# Patient Record
Sex: FEMALE | Race: WHITE | Employment: FULL TIME | URBAN - METROPOLITAN AREA
[De-identification: names, ages, dates, MRNs, and addresses within clinical notes are randomized per-mention and may not be internally consistent; named-entity substitution may affect disease eponyms.]

---

## 2017-01-16 ENCOUNTER — OFFICE VISIT (OUTPATIENT)
Dept: SURGERY | Age: 42
End: 2017-01-16

## 2017-01-16 DIAGNOSIS — K35.30 ACUTE APPENDICITIS WITH LOCALIZED PERITONITIS: Primary | ICD-10-CM

## 2017-01-16 PROCEDURE — 99024 POSTOP FOLLOW-UP VISIT: CPT | Performed by: SURGERY

## 2017-03-15 ENCOUNTER — OFFICE VISIT (OUTPATIENT)
Dept: FAMILY MEDICINE CLINIC | Age: 42
End: 2017-03-15

## 2017-03-15 VITALS
SYSTOLIC BLOOD PRESSURE: 114 MMHG | HEART RATE: 79 BPM | OXYGEN SATURATION: 97 % | DIASTOLIC BLOOD PRESSURE: 60 MMHG | WEIGHT: 193 LBS | BODY MASS INDEX: 30.29 KG/M2 | RESPIRATION RATE: 18 BRPM | HEIGHT: 67 IN

## 2017-03-15 DIAGNOSIS — A08.4 VIRAL GASTROENTERITIS: Primary | ICD-10-CM

## 2017-03-15 PROCEDURE — 99213 OFFICE O/P EST LOW 20 MIN: CPT | Performed by: NURSE PRACTITIONER

## 2017-08-25 ENCOUNTER — OFFICE VISIT (OUTPATIENT)
Dept: FAMILY MEDICINE CLINIC | Age: 42
End: 2017-08-25

## 2017-08-25 VITALS
DIASTOLIC BLOOD PRESSURE: 76 MMHG | SYSTOLIC BLOOD PRESSURE: 112 MMHG | BODY MASS INDEX: 31.11 KG/M2 | WEIGHT: 198.2 LBS | HEIGHT: 67 IN | HEART RATE: 91 BPM | OXYGEN SATURATION: 98 % | RESPIRATION RATE: 16 BRPM

## 2017-08-25 DIAGNOSIS — L30.9 ECZEMA, UNSPECIFIED TYPE: Primary | ICD-10-CM

## 2017-08-25 PROCEDURE — 99213 OFFICE O/P EST LOW 20 MIN: CPT | Performed by: REGISTERED NURSE

## 2017-08-25 RX ORDER — TRIAMCINOLONE ACETONIDE 0.25 MG/G
OINTMENT TOPICAL
Qty: 1 TUBE | Refills: 1 | Status: SHIPPED | OUTPATIENT
Start: 2017-08-25 | End: 2017-09-01

## 2017-08-25 ASSESSMENT — PATIENT HEALTH QUESTIONNAIRE - PHQ9
SUM OF ALL RESPONSES TO PHQ QUESTIONS 1-9: 1
1. LITTLE INTEREST OR PLEASURE IN DOING THINGS: 0
2. FEELING DOWN, DEPRESSED OR HOPELESS: 1
SUM OF ALL RESPONSES TO PHQ9 QUESTIONS 1 & 2: 1

## 2017-08-25 ASSESSMENT — ENCOUNTER SYMPTOMS
SHORTNESS OF BREATH: 0
COUGH: 0
CHEST TIGHTNESS: 0

## 2019-07-18 ENCOUNTER — OFFICE VISIT (OUTPATIENT)
Dept: FAMILY MEDICINE CLINIC | Age: 44
End: 2019-07-18
Payer: COMMERCIAL

## 2019-07-18 VITALS
DIASTOLIC BLOOD PRESSURE: 80 MMHG | BODY MASS INDEX: 30.45 KG/M2 | SYSTOLIC BLOOD PRESSURE: 134 MMHG | HEIGHT: 67 IN | WEIGHT: 194 LBS

## 2019-07-18 DIAGNOSIS — M25.471 ANKLE EDEMA, BILATERAL: Primary | ICD-10-CM

## 2019-07-18 DIAGNOSIS — M25.472 ANKLE EDEMA, BILATERAL: Primary | ICD-10-CM

## 2019-07-18 LAB
A/G RATIO: 1.5 (ref 1.1–2.2)
ALBUMIN SERPL-MCNC: 4.2 G/DL (ref 3.4–5)
ALP BLD-CCNC: 56 U/L (ref 40–129)
ALT SERPL-CCNC: 17 U/L (ref 10–40)
ANION GAP SERPL CALCULATED.3IONS-SCNC: 11 MMOL/L (ref 3–16)
AST SERPL-CCNC: 14 U/L (ref 15–37)
BASOPHILS ABSOLUTE: 0.1 K/UL (ref 0–0.2)
BASOPHILS RELATIVE PERCENT: 0.7 %
BILIRUB SERPL-MCNC: 0.3 MG/DL (ref 0–1)
BUN BLDV-MCNC: 11 MG/DL (ref 7–20)
CALCIUM SERPL-MCNC: 9.4 MG/DL (ref 8.3–10.6)
CHLORIDE BLD-SCNC: 101 MMOL/L (ref 99–110)
CO2: 24 MMOL/L (ref 21–32)
CREAT SERPL-MCNC: 0.5 MG/DL (ref 0.6–1.1)
D DIMER: <200 NG/ML DDU (ref 0–229)
EOSINOPHILS ABSOLUTE: 0.3 K/UL (ref 0–0.6)
EOSINOPHILS RELATIVE PERCENT: 5 %
GFR AFRICAN AMERICAN: >60
GFR NON-AFRICAN AMERICAN: >60
GLOBULIN: 2.8 G/DL
GLUCOSE BLD-MCNC: 95 MG/DL (ref 70–99)
HCT VFR BLD CALC: 38.6 % (ref 36–48)
HEMOGLOBIN: 12.9 G/DL (ref 12–16)
LYMPHOCYTES ABSOLUTE: 1.4 K/UL (ref 1–5.1)
LYMPHOCYTES RELATIVE PERCENT: 20.1 %
MCH RBC QN AUTO: 30.2 PG (ref 26–34)
MCHC RBC AUTO-ENTMCNC: 33.5 G/DL (ref 31–36)
MCV RBC AUTO: 90 FL (ref 80–100)
MONOCYTES ABSOLUTE: 0.4 K/UL (ref 0–1.3)
MONOCYTES RELATIVE PERCENT: 5.2 %
NEUTROPHILS ABSOLUTE: 4.8 K/UL (ref 1.7–7.7)
NEUTROPHILS RELATIVE PERCENT: 69 %
PDW BLD-RTO: 12.8 % (ref 12.4–15.4)
PLATELET # BLD: 254 K/UL (ref 135–450)
PMV BLD AUTO: 9 FL (ref 5–10.5)
POTASSIUM SERPL-SCNC: 4.5 MMOL/L (ref 3.5–5.1)
PRO-BNP: 150 PG/ML (ref 0–124)
RBC # BLD: 4.29 M/UL (ref 4–5.2)
SODIUM BLD-SCNC: 136 MMOL/L (ref 136–145)
TOTAL PROTEIN: 7 G/DL (ref 6.4–8.2)
WBC # BLD: 6.9 K/UL (ref 4–11)

## 2019-07-18 PROCEDURE — 36415 COLL VENOUS BLD VENIPUNCTURE: CPT | Performed by: REGISTERED NURSE

## 2019-07-18 PROCEDURE — 99213 OFFICE O/P EST LOW 20 MIN: CPT | Performed by: REGISTERED NURSE

## 2019-07-18 ASSESSMENT — ENCOUNTER SYMPTOMS
COUGH: 0
COLOR CHANGE: 0
GASTROINTESTINAL NEGATIVE: 1
CHEST TIGHTNESS: 0
SHORTNESS OF BREATH: 0
WHEEZING: 0

## 2019-07-18 NOTE — PROGRESS NOTES
results have been reported in  early pregnancy due to insufficient amounts of hCG and in late  first trimester pregnancies, though very rare, due to the hook  effect. Always repeat results in question with a serum  quantitative pregnancy test. A serum hCG is positive 2-5 days  before the urine pregnancy test.      Color, UA 01/02/2017 YELLOW  Straw/Yellow Final    Clarity, UA 01/02/2017 Clear  Clear Final    Glucose, Ur 01/02/2017 Negative  Negative mg/dL Final    Bilirubin Urine 01/02/2017 Negative  Negative Final    Ketones, Urine 01/02/2017 Negative  Negative mg/dL Final    Specific Gravity, UA 01/02/2017 1.010  1.005 - 1.030 Final    Blood, Urine 01/02/2017 Negative  Negative Final    pH, UA 01/02/2017 7.5  5.0 - 8.0 Final    Protein, UA 01/02/2017 Negative  Negative mg/dL Final    Urobilinogen, Urine 01/02/2017 0.2  <2.0 E.U./dL Final    Nitrite, Urine 01/02/2017 Negative  Negative Final    Leukocyte Esterase, Urine 01/02/2017 Negative  Negative Final    Microscopic Examination 01/02/2017 Not Indicated   Final    Urine Reflex to Culture 01/02/2017 Not Indicated   Final    Urine Type 01/02/2017 Not Specified   Final    Sodium 01/03/2017 138  136 - 145 mmol/L Final    Potassium 01/03/2017 3.9  3.5 - 5.1 mmol/L Final    Chloride 01/03/2017 102  99 - 110 mmol/L Final    CO2 01/03/2017 24  21 - 32 mmol/L Final    Anion Gap 01/03/2017 12  3 - 16 Final    Glucose 01/03/2017 97  70 - 99 mg/dL Final    BUN 01/03/2017 5* 7 - 20 mg/dL Final    CREATININE 01/03/2017 <0.5* 0.6 - 1.1 mg/dL Final    GFR Non- 01/03/2017 >60  >60 Final    Comment: >60 mL/min/1.73m2 EGFR, calc. for ages 25 and older using the  MDRD formula (not corrected for weight), is valid for stable  renal function.  GFR  01/03/2017 >60  >60 Final    Comment: Chronic Kidney Disease: less than 60 ml/min/1.73 sq.m. Kidney Failure: less than 15 ml/min/1.73 sq.m.   Results valid for patients CBC Auto Differential     Standing Status:   Future     Number of Occurrences:   1     Standing Expiration Date:   7/17/2020    Comprehensive Metabolic Panel     Standing Status:   Future     Number of Occurrences:   1     Standing Expiration Date:   7/17/2020    Brain Natriuretic Peptide     Standing Status:   Future     Number of Occurrences:   1     Standing Expiration Date:   7/17/2020    D-Dimer, Quantitative     Standing Status:   Future     Number of Occurrences:   1     Standing Expiration Date:   7/17/2020       Return if symptoms worsen or fail to improve.     Joseph Griggs NP    7/18/2019  8:04 AM

## 2019-09-27 ENCOUNTER — HOSPITAL ENCOUNTER (OUTPATIENT)
Age: 44
Discharge: HOME | End: 2019-09-27

## 2019-09-27 ENCOUNTER — OFFICE VISIT (OUTPATIENT)
Dept: FAMILY MEDICINE CLINIC | Age: 44
End: 2019-09-27
Payer: COMMERCIAL

## 2019-09-27 ENCOUNTER — HOSPITAL ENCOUNTER (OUTPATIENT)
Dept: GENERAL RADIOLOGY | Age: 44
Discharge: HOME | End: 2019-09-27

## 2019-09-27 VITALS
HEIGHT: 67 IN | BODY MASS INDEX: 30.38 KG/M2 | SYSTOLIC BLOOD PRESSURE: 126 MMHG | RESPIRATION RATE: 16 BRPM | HEART RATE: 70 BPM | DIASTOLIC BLOOD PRESSURE: 82 MMHG

## 2019-09-27 DIAGNOSIS — S93.402A SPRAIN OF LEFT ANKLE, UNSPECIFIED LIGAMENT, INITIAL ENCOUNTER: ICD-10-CM

## 2019-09-27 DIAGNOSIS — S93.402A SPRAIN OF LEFT ANKLE, UNSPECIFIED LIGAMENT, INITIAL ENCOUNTER: Primary | ICD-10-CM

## 2019-09-27 DIAGNOSIS — Z23 NEED FOR INFLUENZA VACCINATION: ICD-10-CM

## 2019-09-27 PROCEDURE — 90471 IMMUNIZATION ADMIN: CPT | Performed by: NURSE PRACTITIONER

## 2019-09-27 PROCEDURE — 90686 IIV4 VACC NO PRSV 0.5 ML IM: CPT | Performed by: NURSE PRACTITIONER

## 2019-09-27 PROCEDURE — 73610 X-RAY EXAM OF ANKLE: CPT

## 2019-09-27 PROCEDURE — 99213 OFFICE O/P EST LOW 20 MIN: CPT | Performed by: NURSE PRACTITIONER

## 2019-09-27 RX ORDER — METHYLPREDNISOLONE 4 MG/1
TABLET ORAL
Refills: 0 | COMMUNITY
Start: 2019-09-23 | End: 2019-11-07 | Stop reason: ALTCHOICE

## 2019-09-27 ASSESSMENT — PATIENT HEALTH QUESTIONNAIRE - PHQ9
2. FEELING DOWN, DEPRESSED OR HOPELESS: 0
1. LITTLE INTEREST OR PLEASURE IN DOING THINGS: 0
SUM OF ALL RESPONSES TO PHQ QUESTIONS 1-9: 0
SUM OF ALL RESPONSES TO PHQ9 QUESTIONS 1 & 2: 0
SUM OF ALL RESPONSES TO PHQ QUESTIONS 1-9: 0

## 2019-09-27 NOTE — PROGRESS NOTES
resistance.)  2. While sitting on the floor or in a chair, loop the other end of the band over the top of your affected foot. 3. Keeping your knee and leg straight, slowly flex your foot to pull back on the exercise band, and then slowly relax. 4. Repeat 8 to 12 times. Single-leg balance    1. Stand on a flat surface with your arms stretched out to your sides like you are making the letter \"T. \" Then lift your good leg off the floor, bending it at the knee. If you are not steady on your feet, use one hand to hold on to a chair, counter, or wall. 2. Standing on the leg with your affected ankle, keep that knee straight. Try to balance on that leg for up to 30 seconds. Then rest for up to 10 seconds. 3. Repeat 6 to 8 times. 4. When you can balance on your affected leg for 30 seconds with your eyes open, try to balance on it with your eyes closed. 5. When you can do this exercise with your eyes closed for 30 seconds and with ease and no pain, try standing on a pillow or piece of foam, and repeat steps 1 through 4. Follow-up care is a key part of your treatment and safety. Be sure to make and go to all appointments, and call your doctor if you are having problems. It's also a good idea to know your test results and keep a list of the medicines you take. Where can you learn more? Go to https://chdanieleb.Red[employer]. org and sign in to your [employer] account. Enter Jada Candelaria in the [facility] box to learn more about \"Ankle Sprain: Rehab Exercises. \"     If you do not have an account, please click on the \"Sign Up Now\" link. Current as of: September 20, 2018  Content Version: 12.1  © 0082-9139 Healthwise, Incorporated. Care instructions adapted under license by [facility] ([facility]). If you have questions about a medical condition or this instruction, always ask your healthcare professional. [address] any warranty or liability for your use of this information.        When finished

## 2019-09-27 NOTE — PATIENT INSTRUCTIONS
loop the band around the inside of your affected foot. Then press your other foot against the band. 3. Keeping your legs crossed, slowly push your affected foot against the band so that foot moves away from your other foot. Then slowly relax. 4. Repeat 8 to 12 times. Resisted ankle eversion    1. Sit on the floor with your legs straight. 2. Hold both ends of an exercise band and loop the band around the outside of your affected foot. Then press your other foot against the band. 3. Keeping your leg straight, slowly push your affected foot outward against the band and away from your other foot without letting your leg rotate. Then slowly relax. 4. Repeat 8 to 12 times. Resisted ankle dorsiflexion    1. Tie the ends of an exercise band together to form a loop. Attach one end of the loop to a secure object or shut a door on it to hold it in place. (Or you can have someone hold one end of the loop to provide resistance.)  2. While sitting on the floor or in a chair, loop the other end of the band over the top of your affected foot. 3. Keeping your knee and leg straight, slowly flex your foot to pull back on the exercise band, and then slowly relax. 4. Repeat 8 to 12 times. Single-leg balance    1. Stand on a flat surface with your arms stretched out to your sides like you are making the letter \"T. \" Then lift your good leg off the floor, bending it at the knee. If you are not steady on your feet, use one hand to hold on to a chair, counter, or wall. 2. Standing on the leg with your affected ankle, keep that knee straight. Try to balance on that leg for up to 30 seconds. Then rest for up to 10 seconds. 3. Repeat 6 to 8 times. 4. When you can balance on your affected leg for 30 seconds with your eyes open, try to balance on it with your eyes closed.   5. When you can do this exercise with your eyes closed for 30 seconds and with ease and no pain, try standing on a pillow or piece of foam, and repeat steps 1 through 4. Follow-up care is a key part of your treatment and safety. Be sure to make and go to all appointments, and call your doctor if you are having problems. It's also a good idea to know your test results and keep a list of the medicines you take. Where can you learn more? Go to https://chpepiceweb.Sc[employer]. org and sign in to your [employer] account. Enter Tonio Martínez in the [employer] box to learn more about \"Ankle Sprain: Rehab Exercises. \"     If you do not have an account, please click on the \"Sign Up Now\" link. Current as of: September 20, 2018  Content Version: 12.1  © 5706-2596 Healthwise, Incorporated. Care instructions adapted under license by [facility] ([facility]). If you have questions about a medical condition or this instruction, always ask your healthcare professional. [address] any warranty or liability for your use of this information. When finished with the prednisone, can start ibuprofen 600-800 mg every 8 hours, or aleve 220-440 mg every 12 hours with food.   Rest Ice ACE wrap and elevate  When improves then start on the exercises

## 2019-11-07 ENCOUNTER — OFFICE VISIT (OUTPATIENT)
Dept: FAMILY MEDICINE CLINIC | Age: 44
End: 2019-11-07
Payer: COMMERCIAL

## 2019-11-07 VITALS
HEART RATE: 88 BPM | WEIGHT: 197.4 LBS | HEIGHT: 67 IN | OXYGEN SATURATION: 99 % | DIASTOLIC BLOOD PRESSURE: 72 MMHG | TEMPERATURE: 98.3 F | SYSTOLIC BLOOD PRESSURE: 118 MMHG | RESPIRATION RATE: 16 BRPM | BODY MASS INDEX: 30.98 KG/M2

## 2019-11-07 DIAGNOSIS — B96.89 ACUTE BACTERIAL SINUSITIS: Primary | ICD-10-CM

## 2019-11-07 DIAGNOSIS — J01.90 ACUTE BACTERIAL SINUSITIS: Primary | ICD-10-CM

## 2019-11-07 PROCEDURE — 99213 OFFICE O/P EST LOW 20 MIN: CPT | Performed by: NURSE PRACTITIONER

## 2019-11-07 RX ORDER — AMOXICILLIN AND CLAVULANATE POTASSIUM 875; 125 MG/1; MG/1
1 TABLET, FILM COATED ORAL 2 TIMES DAILY WITH MEALS
Qty: 20 TABLET | Refills: 0 | Status: SHIPPED | OUTPATIENT
Start: 2019-11-07 | End: 2019-11-17

## 2019-11-19 ENCOUNTER — OFFICE VISIT (OUTPATIENT)
Dept: FAMILY MEDICINE CLINIC | Age: 44
End: 2019-11-19
Payer: COMMERCIAL

## 2019-11-19 VITALS
SYSTOLIC BLOOD PRESSURE: 124 MMHG | OXYGEN SATURATION: 99 % | HEIGHT: 67 IN | TEMPERATURE: 98 F | BODY MASS INDEX: 30.7 KG/M2 | RESPIRATION RATE: 16 BRPM | HEART RATE: 96 BPM | WEIGHT: 195.6 LBS | DIASTOLIC BLOOD PRESSURE: 78 MMHG

## 2019-11-19 DIAGNOSIS — M54.2 NECK PAIN: ICD-10-CM

## 2019-11-19 DIAGNOSIS — G43.001 MIGRAINE WITHOUT AURA AND WITH STATUS MIGRAINOSUS, NOT INTRACTABLE: Primary | ICD-10-CM

## 2019-11-19 PROCEDURE — 99213 OFFICE O/P EST LOW 20 MIN: CPT | Performed by: REGISTERED NURSE

## 2019-11-19 RX ORDER — CYCLOBENZAPRINE HCL 10 MG
10 TABLET ORAL NIGHTLY PRN
Qty: 30 TABLET | Refills: 3 | Status: SHIPPED | OUTPATIENT
Start: 2019-11-19

## 2019-11-19 RX ORDER — TOPIRAMATE 25 MG/1
25 TABLET ORAL NIGHTLY
Qty: 30 TABLET | Refills: 3 | Status: SHIPPED | OUTPATIENT
Start: 2019-11-19

## 2019-11-19 ASSESSMENT — ENCOUNTER SYMPTOMS
ABDOMINAL PAIN: 0
NAUSEA: 1
PHOTOPHOBIA: 0
CONSTIPATION: 0
WHEEZING: 0
ABDOMINAL DISTENTION: 0
VOMITING: 0
COUGH: 0
CHEST TIGHTNESS: 0
SHORTNESS OF BREATH: 0
DIARRHEA: 0

## 2019-12-12 ENCOUNTER — NURSE ONLY (OUTPATIENT)
Dept: FAMILY MEDICINE CLINIC | Age: 44
End: 2019-12-12
Payer: COMMERCIAL

## 2019-12-12 DIAGNOSIS — R39.9 UTI SYMPTOMS: Primary | ICD-10-CM

## 2019-12-12 LAB
BILIRUBIN, POC: ABNORMAL
BLOOD URINE, POC: ABNORMAL
CLARITY, POC: CLEAR
COLOR, POC: YELLOW
GLUCOSE URINE, POC: ABNORMAL
KETONES, POC: ABNORMAL
LEUKOCYTE EST, POC: ABNORMAL
NITRITE, POC: ABNORMAL
PH, POC: 7
PROTEIN, POC: ABNORMAL
SPECIFIC GRAVITY, POC: 1.02
UROBILINOGEN, POC: 0.2

## 2019-12-12 PROCEDURE — 81002 URINALYSIS NONAUTO W/O SCOPE: CPT | Performed by: NURSE PRACTITIONER

## 2019-12-12 RX ORDER — SULFAMETHOXAZOLE AND TRIMETHOPRIM 800; 160 MG/1; MG/1
1 TABLET ORAL 2 TIMES DAILY
Qty: 14 TABLET | Refills: 0 | Status: SHIPPED | OUTPATIENT
Start: 2019-12-12 | End: 2019-12-26

## 2019-12-14 LAB — URINE CULTURE, ROUTINE: NORMAL

## 2020-03-15 ENCOUNTER — APPOINTMENT (OUTPATIENT)
Dept: CT IMAGING | Age: 45
End: 2020-03-15
Payer: COMMERCIAL

## 2020-03-15 ENCOUNTER — HOSPITAL ENCOUNTER (OUTPATIENT)
Age: 45
Setting detail: OBSERVATION
Discharge: HOME | End: 2020-03-16
Attending: EMERGENCY MEDICINE | Admitting: INTERNAL MEDICINE
Payer: COMMERCIAL

## 2020-03-15 ENCOUNTER — APPOINTMENT (OUTPATIENT)
Dept: GENERAL RADIOLOGY | Age: 45
End: 2020-03-15
Payer: COMMERCIAL

## 2020-03-15 PROBLEM — R55 SYNCOPE AND COLLAPSE: Status: ACTIVE | Noted: 2020-03-15

## 2020-03-15 LAB
A/G RATIO: 1.1 (ref 1.1–2.2)
ALBUMIN SERPL-MCNC: 3.8 G/DL (ref 3.4–5)
ALP BLD-CCNC: 53 U/L (ref 40–129)
ALT SERPL-CCNC: 16 U/L (ref 10–40)
ANION GAP SERPL CALCULATED.3IONS-SCNC: 15 MMOL/L (ref 3–16)
AST SERPL-CCNC: 14 U/L (ref 15–37)
BASOPHILS ABSOLUTE: 0.1 K/UL (ref 0–0.2)
BASOPHILS RELATIVE PERCENT: 0.6 %
BILIRUB SERPL-MCNC: 0.4 MG/DL (ref 0–1)
BUN BLDV-MCNC: 15 MG/DL (ref 7–20)
C-REACTIVE PROTEIN: 15.2 MG/L (ref 0–5.1)
CALCIUM SERPL-MCNC: 9.1 MG/DL (ref 8.3–10.6)
CHLORIDE BLD-SCNC: 98 MMOL/L (ref 99–110)
CO2: 22 MMOL/L (ref 21–32)
CREAT SERPL-MCNC: 0.7 MG/DL (ref 0.6–1.1)
EKG ATRIAL RATE: 74 BPM
EKG DIAGNOSIS: NORMAL
EKG P AXIS: 31 DEGREES
EKG P-R INTERVAL: 134 MS
EKG Q-T INTERVAL: 412 MS
EKG QRS DURATION: 80 MS
EKG QTC CALCULATION (BAZETT): 457 MS
EKG R AXIS: 18 DEGREES
EKG T AXIS: 45 DEGREES
EKG VENTRICULAR RATE: 74 BPM
EOSINOPHILS ABSOLUTE: 0.2 K/UL (ref 0–0.6)
EOSINOPHILS RELATIVE PERCENT: 1.7 %
GFR AFRICAN AMERICAN: >60
GFR NON-AFRICAN AMERICAN: >60
GLOBULIN: 3.5 G/DL
GLUCOSE BLD-MCNC: 136 MG/DL (ref 70–99)
HCG QUALITATIVE: NEGATIVE
HCT VFR BLD CALC: 40.6 % (ref 36–48)
HEMOGLOBIN: 14 G/DL (ref 12–16)
LYMPHOCYTES ABSOLUTE: 2.7 K/UL (ref 1–5.1)
LYMPHOCYTES RELATIVE PERCENT: 17.9 %
MCH RBC QN AUTO: 30.6 PG (ref 26–34)
MCHC RBC AUTO-ENTMCNC: 34.6 G/DL (ref 31–36)
MCV RBC AUTO: 88.5 FL (ref 80–100)
MONOCYTES ABSOLUTE: 0.9 K/UL (ref 0–1.3)
MONOCYTES RELATIVE PERCENT: 5.9 %
NEUTROPHILS ABSOLUTE: 11.1 K/UL (ref 1.7–7.7)
NEUTROPHILS RELATIVE PERCENT: 73.9 %
PDW BLD-RTO: 11.9 % (ref 12.4–15.4)
PLATELET # BLD: 305 K/UL (ref 135–450)
PMV BLD AUTO: 8.3 FL (ref 5–10.5)
POTASSIUM SERPL-SCNC: 3.1 MMOL/L (ref 3.5–5.1)
RBC # BLD: 4.59 M/UL (ref 4–5.2)
SEDIMENTATION RATE, ERYTHROCYTE: 17 MM/HR (ref 0–20)
SODIUM BLD-SCNC: 135 MMOL/L (ref 136–145)
TOTAL PROTEIN: 7.3 G/DL (ref 6.4–8.2)
TROPONIN: <0.01 NG/ML
WBC # BLD: 15.1 K/UL (ref 4–11)

## 2020-03-15 PROCEDURE — 85025 COMPLETE CBC W/AUTO DIFF WBC: CPT

## 2020-03-15 PROCEDURE — G0378 HOSPITAL OBSERVATION PER HR: HCPCS

## 2020-03-15 PROCEDURE — 86140 C-REACTIVE PROTEIN: CPT

## 2020-03-15 PROCEDURE — 2580000003 HC RX 258: Performed by: EMERGENCY MEDICINE

## 2020-03-15 PROCEDURE — 70450 CT HEAD/BRAIN W/O DYE: CPT

## 2020-03-15 PROCEDURE — 93010 ELECTROCARDIOGRAM REPORT: CPT | Performed by: INTERNAL MEDICINE

## 2020-03-15 PROCEDURE — 71045 X-RAY EXAM CHEST 1 VIEW: CPT

## 2020-03-15 PROCEDURE — 84484 ASSAY OF TROPONIN QUANT: CPT

## 2020-03-15 PROCEDURE — 99285 EMERGENCY DEPT VISIT HI MDM: CPT

## 2020-03-15 PROCEDURE — 6370000000 HC RX 637 (ALT 250 FOR IP): Performed by: INTERNAL MEDICINE

## 2020-03-15 PROCEDURE — 6370000000 HC RX 637 (ALT 250 FOR IP): Performed by: NURSE PRACTITIONER

## 2020-03-15 PROCEDURE — 85652 RBC SED RATE AUTOMATED: CPT

## 2020-03-15 PROCEDURE — 93005 ELECTROCARDIOGRAM TRACING: CPT | Performed by: EMERGENCY MEDICINE

## 2020-03-15 PROCEDURE — 84703 CHORIONIC GONADOTROPIN ASSAY: CPT

## 2020-03-15 PROCEDURE — 6360000004 HC RX CONTRAST MEDICATION: Performed by: EMERGENCY MEDICINE

## 2020-03-15 PROCEDURE — 80053 COMPREHEN METABOLIC PANEL: CPT

## 2020-03-15 PROCEDURE — 74177 CT ABD & PELVIS W/CONTRAST: CPT

## 2020-03-15 PROCEDURE — 2580000003 HC RX 258

## 2020-03-15 PROCEDURE — 6370000000 HC RX 637 (ALT 250 FOR IP): Performed by: EMERGENCY MEDICINE

## 2020-03-15 PROCEDURE — 2580000003 HC RX 258: Performed by: INTERNAL MEDICINE

## 2020-03-15 RX ORDER — FLUTICASONE PROPIONATE 50 MCG
1 SPRAY, SUSPENSION (ML) NASAL DAILY
COMMUNITY

## 2020-03-15 RX ORDER — TOPIRAMATE 25 MG/1
25 TABLET ORAL NIGHTLY
Status: DISCONTINUED | OUTPATIENT
Start: 2020-03-15 | End: 2020-03-16 | Stop reason: HOSPADM

## 2020-03-15 RX ORDER — PROMETHAZINE HYDROCHLORIDE 25 MG/1
12.5 TABLET ORAL EVERY 6 HOURS PRN
Status: DISCONTINUED | OUTPATIENT
Start: 2020-03-15 | End: 2020-03-16 | Stop reason: HOSPADM

## 2020-03-15 RX ORDER — POTASSIUM BICARBONATE 25 MEQ/1
25 TABLET, EFFERVESCENT ORAL ONCE
Status: COMPLETED | OUTPATIENT
Start: 2020-03-15 | End: 2020-03-15

## 2020-03-15 RX ORDER — ONDANSETRON 2 MG/ML
4 INJECTION INTRAMUSCULAR; INTRAVENOUS EVERY 6 HOURS PRN
Status: DISCONTINUED | OUTPATIENT
Start: 2020-03-15 | End: 2020-03-16 | Stop reason: HOSPADM

## 2020-03-15 RX ORDER — ACETAMINOPHEN 650 MG/1
650 SUPPOSITORY RECTAL EVERY 6 HOURS PRN
Status: DISCONTINUED | OUTPATIENT
Start: 2020-03-15 | End: 2020-03-16 | Stop reason: HOSPADM

## 2020-03-15 RX ORDER — SODIUM CHLORIDE 0.9 % (FLUSH) 0.9 %
10 SYRINGE (ML) INJECTION EVERY 12 HOURS SCHEDULED
Status: DISCONTINUED | OUTPATIENT
Start: 2020-03-15 | End: 2020-03-16 | Stop reason: HOSPADM

## 2020-03-15 RX ORDER — 0.9 % SODIUM CHLORIDE 0.9 %
1000 INTRAVENOUS SOLUTION INTRAVENOUS ONCE
Status: COMPLETED | OUTPATIENT
Start: 2020-03-15 | End: 2020-03-15

## 2020-03-15 RX ORDER — POTASSIUM CHLORIDE 20MEQ/15ML
40 LIQUID (ML) ORAL ONCE
Status: DISCONTINUED | OUTPATIENT
Start: 2020-03-15 | End: 2020-03-15

## 2020-03-15 RX ORDER — SODIUM CHLORIDE 9 MG/ML
INJECTION, SOLUTION INTRAVENOUS CONTINUOUS
Status: DISCONTINUED | OUTPATIENT
Start: 2020-03-15 | End: 2020-03-16 | Stop reason: HOSPADM

## 2020-03-15 RX ORDER — AZELASTINE HCL 205.5 UG/1
1 SPRAY NASAL DAILY
COMMUNITY

## 2020-03-15 RX ORDER — SODIUM CHLORIDE 9 MG/ML
INJECTION, SOLUTION INTRAVENOUS
Status: COMPLETED
Start: 2020-03-15 | End: 2020-03-15

## 2020-03-15 RX ORDER — SODIUM CHLORIDE 0.9 % (FLUSH) 0.9 %
10 SYRINGE (ML) INJECTION PRN
Status: DISCONTINUED | OUTPATIENT
Start: 2020-03-15 | End: 2020-03-16 | Stop reason: HOSPADM

## 2020-03-15 RX ORDER — POLYETHYLENE GLYCOL 3350 17 G/17G
17 POWDER, FOR SOLUTION ORAL DAILY PRN
Status: DISCONTINUED | OUTPATIENT
Start: 2020-03-15 | End: 2020-03-16 | Stop reason: HOSPADM

## 2020-03-15 RX ORDER — POTASSIUM BICARBONATE 25 MEQ/1
25 TABLET, EFFERVESCENT ORAL 2 TIMES DAILY
Status: DISCONTINUED | OUTPATIENT
Start: 2020-03-15 | End: 2020-03-15

## 2020-03-15 RX ORDER — CHLORTHALIDONE 25 MG/1
25 TABLET ORAL DAILY
Status: ON HOLD | COMMUNITY
Start: 2020-03-13 | End: 2020-03-16 | Stop reason: HOSPADM

## 2020-03-15 RX ORDER — 0.9 % SODIUM CHLORIDE 0.9 %
1000 INTRAVENOUS SOLUTION INTRAVENOUS ONCE
Status: DISCONTINUED | OUTPATIENT
Start: 2020-03-15 | End: 2020-03-16 | Stop reason: HOSPADM

## 2020-03-15 RX ORDER — ACETAMINOPHEN 325 MG/1
650 TABLET ORAL EVERY 6 HOURS PRN
Status: DISCONTINUED | OUTPATIENT
Start: 2020-03-15 | End: 2020-03-16 | Stop reason: HOSPADM

## 2020-03-15 RX ORDER — LISINOPRIL 10 MG/1
10 TABLET ORAL DAILY
COMMUNITY

## 2020-03-15 RX ORDER — SIMETHICONE 80 MG
80 TABLET,CHEWABLE ORAL EVERY 6 HOURS PRN
Status: DISCONTINUED | OUTPATIENT
Start: 2020-03-15 | End: 2020-03-16 | Stop reason: HOSPADM

## 2020-03-15 RX ADMIN — TOPIRAMATE 25 MG: 25 TABLET, FILM COATED ORAL at 21:12

## 2020-03-15 RX ADMIN — SODIUM CHLORIDE: 9 INJECTION, SOLUTION INTRAVENOUS at 17:20

## 2020-03-15 RX ADMIN — Medication 10 ML: at 12:17

## 2020-03-15 RX ADMIN — SODIUM CHLORIDE 1000 ML: 9 INJECTION, SOLUTION INTRAVENOUS at 05:06

## 2020-03-15 RX ADMIN — SIMETHICONE 80 MG: 80 TABLET, CHEWABLE ORAL at 21:12

## 2020-03-15 RX ADMIN — SODIUM CHLORIDE: 9 INJECTION, SOLUTION INTRAVENOUS at 10:58

## 2020-03-15 RX ADMIN — SODIUM CHLORIDE 1000 ML: 9 INJECTION, SOLUTION INTRAVENOUS at 04:07

## 2020-03-15 RX ADMIN — IOPAMIDOL 75 ML: 755 INJECTION, SOLUTION INTRAVENOUS at 06:09

## 2020-03-15 RX ADMIN — Medication 1000 ML: at 04:07

## 2020-03-15 RX ADMIN — POTASSIUM BICARBONATE 25 MEQ: 978 TABLET, EFFERVESCENT ORAL at 07:16

## 2020-03-15 ASSESSMENT — PAIN SCALES - GENERAL
PAINLEVEL_OUTOF10: 3
PAINLEVEL_OUTOF10: 3

## 2020-03-15 ASSESSMENT — PAIN DESCRIPTION - ORIENTATION: ORIENTATION: RIGHT;LEFT;MID

## 2020-03-15 ASSESSMENT — PAIN DESCRIPTION - PAIN TYPE: TYPE: ACUTE PAIN

## 2020-03-15 ASSESSMENT — PAIN DESCRIPTION - DESCRIPTORS: DESCRIPTORS: CRAMPING

## 2020-03-15 ASSESSMENT — PAIN - FUNCTIONAL ASSESSMENT: PAIN_FUNCTIONAL_ASSESSMENT: ACTIVITIES ARE NOT PREVENTED

## 2020-03-15 ASSESSMENT — PAIN DESCRIPTION - LOCATION: LOCATION: ABDOMEN

## 2020-03-15 ASSESSMENT — PAIN DESCRIPTION - PROGRESSION: CLINICAL_PROGRESSION: NOT CHANGED

## 2020-03-15 ASSESSMENT — PAIN DESCRIPTION - ONSET: ONSET: ON-GOING

## 2020-03-15 ASSESSMENT — PAIN DESCRIPTION - FREQUENCY: FREQUENCY: INTERMITTENT

## 2020-03-15 NOTE — PLAN OF CARE
Patient evaluated at bedside . Recommended Overnight Observation . Patient wishing to go home at this time . She is requesting time to think about it and let us know her decision of getting admitted or not . Currently receiving her 3 rd L NS . Has Minimal Mid abdomen soarness but no Tenderness . Full H&P to follow.      Ghanshyam Abdul MD  Hospitalist Physician

## 2020-03-15 NOTE — ED NOTES
Patient ambulated to the restroom at this time. Patient reports she feels more stable at this time.      Nevin LernerWell[facility]  03/15/20 [id number]

## 2020-03-15 NOTE — ED NOTES
Patient resting with IV infusing at this time, call light to lap and cell phone in stretcher with patient. Covered with warm blanket and adjust pillow. Patient reports no pain.            Tash SmallwoodSelect [facility]  03/15/20 [id number]

## 2020-03-15 NOTE — ED PROVIDER NOTES
[facility]  ED PROVIDER NOTE    Patient Identification  Pt Name: Agustín Shah  MRN: [id number]  Teodorogfsidney 1975  Date of evaluation: 3/15/2020  Provider: Radha Resendiz MD  PCP: No primary care provider on file. Chief Complaint  Dizziness (started lisinopril and cyclobenzaprine friday and felt light headed; took muscle relaxer last night; passed out this morning;  denies injury. )      HPI  History provided by patient   This is a 38 y.o. female who presents to the ED for dizziness. Started lisinopril, chlorthalidone, Flexeril, Topamax on Friday. Has taken Topamax in the past.  States that she has been taking these medicines as prescribed. Took Topamax for migraine which started on Thursday. Has had this several times in the past.  No fevers or neck stiffness. No chest pain or shortness of breath. No abdominal pain. Last flexeril was Friday. ROS  10 systems reviewed, pertinent positives/negatives per HPI otherwise noted to be negative. I have reviewed the following nursing documentation:  Allergies: Patient has no known allergies. Past medical history:   Past Medical History:   Diagnosis Date    Anxiety     Back pain     Eczema     Rheumatic fever     Anemia from this    Seasonal allergies     Strep throat     Continues with annual strep even into adulthood.       Past surgical history:   Past Surgical History:   Procedure Laterality Date    APPENDECTOMY      DENTAL SURGERY         Home medications:   Previous Medications    CHLORTHALIDONE (HYGROTON) 25 MG TABLET    Take 25 mg by mouth daily    CYCLOBENZAPRINE (FLEXERIL) 10 MG TABLET    Take 1 tablet by mouth nightly as needed for Muscle spasms    DICLOFENAC SODIUM POWD    2 g by Does not apply route 4 times daily Formula 3D Baclo 2% Diclo 3% Mary Beth 6% Lido 2% Prilo 2% Pharm to comp    LORATADINE (CLARITIN) 10 MG CAPS    Take 1 capsule by mouth daily    NORETHIN ACE-ETH ESTRAD-FE (BLISOVI 24 FE PO)    Take by mouth TOPIRAMATE (TOPAMAX) 25 MG TABLET    Take 1 tablet by mouth nightly       Social history:  reports that she has never smoked. She has never used smokeless tobacco. She reports current alcohol use. She reports that she does not use drugs. Family history:    Family History   Problem Relation Age of Onset    High Blood Pressure Mother     High Cholesterol Mother     High Blood Pressure Father     Cancer Father         Precancers    High Cholesterol Father     Thyroid Disease Sister         nodule/mass - benign         Exam  ED Triage Vitals [03/15/20 0333]   BP Temp Temp Source Pulse Resp SpO2 Height Weight   -- 98 °F (36.7 °C) Oral 83 16 100 % 5' 7\" (1.702 m) 190 lb (86.2 kg)     Nursing note and vitals reviewed. Constitutional: In no acute distress  HENT:      Head: Normocephalic      Ears: External ears normal.      Nose: Nose normal.     Mouth: Membrane mucosa moist   Eyes: No discharge. Neck: Supple. Trachea midline. Cardiovascular: Regular rate. Warm extremities  Pulmonary/Chest: Effort normal. No respiratory distress. CTAB. Abdominal: Soft. No distension. Nontender  : Deferred  Rectal: Deferred   Musculoskeletal: Moves all extremities. No gross deformity. Neurological: Alert and oriented. Face symmetric. Speech is clear. Cranial nerves II to XII intact. Normal finger-to-nose and heel-to-shin  Skin: Warm and dry. No rash. Psychiatric: Normal mood and affect. Behavior is normal.    Procedures      EKG  The Ekg interpreted by me in the absence of a cardiologist shows. Normal sinus rhythm. No specific ST-T wave changes appreciated. No evidence of acute ischemia. Radiology  CT Head WO Contrast    (Results Pending)   XR CHEST PORTABLE    (Results Pending)       Labs  No results found for this visit on 03/15/20. Screenings           MDM and ED Course  Patient is a 65-year-old woman with past medical history of migraines who presents with dizziness. Found to be hypotensive.   Likely

## 2020-03-15 NOTE — H&P
to Admission medications    Medication Sig Start Date End Date Taking? Authorizing Provider   chlorthalidone (HYGROTON) 25 MG tablet Take 25 mg by mouth daily 3/13/20  Yes Historical Provider, MD   topiramate (TOPAMAX) 25 MG tablet Take 1 tablet by mouth nightly 11/19/19   IGGY Torrez CNP   cyclobenzaprine (FLEXERIL) 10 MG tablet Take 1 tablet by mouth nightly as needed for Muscle spasms 11/19/19   IGGY Torrez CNP   Diclofenac Sodium POWD 2 g by Does not apply route 4 times daily Formula 3D Baclo 2% Diclo 3% Mary Beth 6% Lido 2% Prilo 2% Pharm to comp 11/19/19   IGGY Torrez CNP   Norethin Ace-Eth Estrad-FE (BLISOVI 24 FE PO) Take by mouth    Historical Provider, MD   Loratadine (CLARITIN) 10 MG CAPS Take 1 capsule by mouth daily    Historical Provider, MD       Allergies:  Patient has no known allergies. Social History:    The patient currently lives at home with her  and is independent of IADLs    TOBACCO:   reports that she has never smoked. She has never used smokeless tobacco.  ETOH:   reports current alcohol use. Family History:     Reviewed in detail and negative for DM, CAD, Cancer, CVA. Positive as follows:        Problem Relation Age of Onset    High Blood Pressure Mother     High Cholesterol Mother     High Blood Pressure Father     Cancer Father         Precancers    High Cholesterol Father     Thyroid Disease Sister         nodule/mass - benign       REVIEW OF SYSTEMS:   Pertinent positives as noted in the HPI. All other systems reviewed and negative. PHYSICAL EXAM PERFORMED:  /72   Pulse 87   Temp 98 °F (36.7 °C) (Oral)   Resp 16   Ht 5' 7\" (1.702 m)   Wt 190 lb (86.2 kg)   SpO2 100%   BMI 29.76 kg/m²     General appearance:  No apparent distress, appears stated age and cooperative. HEENT:  Normal cephalic, atraumatic without obvious deformity. Pupils equal, round, and reactive to light. Extra ocular muscles intact.  Conjunctivae/corneas Syncope and collapse [R55] 03/15/2020     ASSESSMENT/PLAN:  1. Syncope x 2 due to Orthostatic Hypotension likely Medication related   -Patient recently started on chlorthalidone 25 mg along with lisinopril 10 mg by PCP  -Systolic blood pressures in 60's upon arrival to ED as per ED physician . Received 2 L fluid bolus in ED; repeat orthostatics mildly positive-ordered 1 more liter fluid bolus and admitted to hospital for further IV hydration.  -Blood pressure improving  -Check orthostatic vitals in the morning  -Monitor on telemetry for any abnormal rhythms  -Discontinue antihypertensives for now and monitor blood pressure. 2.  History of chronic non-intractable headache, unspecified headache type -currently denies any symptoms. On Topamax at home; continue    DVT Prophylaxis: Lovenox  Diet: No diet orders on file  Code Status: Prior    PT/OT Eval Status: Ambulatory    Dispo -placed in observation     Jerrica Evans MD    The note was completed using Dragon -speech recognition software & EMR  . Every effort was made to ensure accuracy; however, inadvertent computerized transcription errors may be present. Thank you No primary care provider on file. for the opportunity to be involved in this patient's care. If you have any questions or concerns please feel free to contact me at [phone].

## 2020-03-15 NOTE — ED PROVIDER NOTES
Emergency Department Encounter  Location: [facility]  ED    Patient: Guzman Molian  MRN: [id number]  : 1975  Date of evaluation: 3/15/2020  ED Provider: Noble Merida MD    06:00a.m. Guzman Molina was checked out to me by Dr. Faina Jang. Please see his/her initial documentation for details of the patient's initial ED presentation, physical exam and completed studies. In brief, Guzman Molina is a 38 y.o. female that presented to the emergency department with report that she was recently started on new medications this Friday for hypotension. Per Dr. Mil Cabrera the medications included lisinopril, chlorthalidone, Flexeril, and Topamax. The patient stated that she almost passed out this morning when she got up. She was noted to be severely hypotensive upon arrival.  The patient received IV fluid hydration with improvement of her orthostatic vital signs although she continued to be orthostatic. The patient reported abdominal pain to Dr. Mil Cabrera. He ordered a CT scan of the abdomen pelvis. Patient is to be reevaluated after results of the study.     I have reviewed and interpreted all of the currently available lab results and diagnostics from this visit:  Results for orders placed or performed during the hospital encounter of 03/15/20   Comprehensive Metabolic Panel   Result Value Ref Range    Sodium 135 (L) 136 - 145 mmol/L    Potassium 3.1 (L) 3.5 - 5.1 mmol/L    Chloride 98 (L) 99 - 110 mmol/L    CO2 22 21 - 32 mmol/L    Anion Gap 15 3 - 16    Glucose 136 (H) 70 - 99 mg/dL    BUN 15 7 - 20 mg/dL    CREATININE 0.7 0.6 - 1.1 mg/dL    GFR Non-African American >60 >60    GFR African American >60 >60    Calcium 9.1 8.3 - 10.6 mg/dL    Total Protein 7.3 6.4 - 8.2 g/dL    Alb 3.8 3.4 - 5.0 g/dL    Albumin/Globulin Ratio 1.1 1.1 - 2.2    Total Bilirubin 0.4 0.0 - 1.0 mg/dL    Alkaline Phosphatase 53 40 - 129 U/L    ALT 16 10 - 40 U/L    AST 14 (L) 15 - 37 U/L    Globulin 3.5 g/dL   CBC Auto Differential   Result Value Ref Range    WBC 15.1 (H) 4.0 - 11.0 K/uL    RBC 4.59 4.00 - 5.20 M/uL    Hemoglobin 14.0 12.0 - 16.0 g/dL    Hematocrit 40.6 36.0 - 48.0 %    MCV 88.5 80.0 - 100.0 fL    MCH 30.6 26.0 - 34.0 pg    MCHC 34.6 31.0 - 36.0 g/dL    RDW 11.9 (L) 12.4 - 15.4 %    Platelets 867 966 - 582 K/uL    MPV 8.3 5.0 - 10.5 fL    Neutrophils % 73.9 %    Lymphocytes % 17.9 %    Monocytes % 5.9 %    Eosinophils % 1.7 %    Basophils % 0.6 %    Neutrophils Absolute 11.1 (H) 1.7 - 7.7 K/uL    Lymphocytes Absolute 2.7 1.0 - 5.1 K/uL    Monocytes Absolute 0.9 0.0 - 1.3 K/uL    Eosinophils Absolute 0.2 0.0 - 0.6 K/uL    Basophils Absolute 0.1 0.0 - 0.2 K/uL   HCG Qualitative, Serum   Result Value Ref Range    hCG Qual Negative Detects HCG level >10 MIU/mL   Troponin   Result Value Ref Range    Troponin <0.01 <0.01 ng/mL     Ct Head Wo Contrast    Result Date: 3/15/2020  EXAMINATION: CT OF THE HEAD WITHOUT CONTRAST  3/15/2020 4:22 am TECHNIQUE: CT of the head was performed without the administration of intravenous contrast. Dose modulation, iterative reconstruction, and/or weight based adjustment of the mA/kV was utilized to reduce the radiation dose to as low as reasonably achievable. COMPARISON: None. HISTORY: ORDERING SYSTEM PROVIDED HISTORY: dizziness TECHNOLOGIST PROVIDED HISTORY: Has a \"code stroke\" or \"stroke alert\" been called? ->No Reason for exam:->dizziness Is the patient pregnant?->No Reason for Exam: diziness. loc Acuity: Acute Type of Exam: Initial Relevant Medical/Surgical History: no hx of seizures or stroke FINDINGS: BRAIN/VENTRICLES: There is no acute intracranial hemorrhage, mass effect or midline shift. No abnormal extra-axial fluid collection. The gray-white differentiation is maintained without evidence of an acute infarct. There is no evidence of hydrocephalus. ORBITS: The visualized portion of the orbits demonstrate no acute abnormality.  SINUSES: The visualized paranasal sinuses and ONE XRAY VIEW OF THE CHEST 3/15/2020 4:45 am COMPARISON: None. HISTORY: ORDERING SYSTEM PROVIDED HISTORY: dizziness TECHNOLOGIST PROVIDED HISTORY: Reason for exam:->dizziness Reason for Exam: Dizziness (started lisinopril and cyclobenzaprine friday and felt light headed; took muscle relaxer last night; passed out this morning; denies injury. ) FINDINGS: Single view provided. Mediastinal, cardiac, and hilar silhouettes are normal.  Normal lung volumes. No acute consolidation or interstitial changes. No effusion or pneumothorax. No free subdiaphragmatic air. No acute pulmonary process. GENERAL APPEARANCE: Awake and alert. Cooperative. No acute distress. HEAD: Normocephalic. Atraumatic. EYES: EOM's grossly intact. Sclera anicteric. ENT: Mucous membranes are moist. Tolerates saliva. No trismus. NECK: Supple. No meningismus. Trachea midline. HEART: RRR. Radial pulses 2+. No audible murmurs. LUNGS: Respirations unlabored. CTAB and symmetrical.  ABDOMEN: Soft. Mild tenderness at the bilateral lower abdomen and mid abdomen tender. No guarding or rebound. EXTREMITIES: No acute deformities. No clubbing, edema, or cyanosis. SKIN: Warm and dry. No obvious lesions or discolorations. NEUROLOGICAL: No gross facial drooping. Moves all 4 extremities spontaneously. Oriented x3. GCS 15. PSYCHIATRIC: Normal mood. Final ED Course and MDM: In brief, Mindi Jackson is a 38 y.o. female whose care was signed out to me by the outgoing provider. In brief, the patient was signed out to follow-up with results of the CAT scan. The CT of the abdomen pelvis demonstrated acute enteritis involving the mid to distal small bowel with small amount of ascites. The chest x-ray demonstrated no acute process. The patient is noted to have a leukocytosis with a white blood cell count of 15.1. The patient reported mild dizziness with the repeat set of orthostatic vital signs.   Patient continues to be mildly hypotensive recognition program. Efforts were made to edit the dictations but occasionally words are mis-transcribed.)    Elina Rosario MD  [address], [place]  03/15/20 2079

## 2020-03-15 NOTE — PROGRESS NOTES
Perfect serve Dr. Tiffanie Alford: BP stabilized. home meds updated. Patient admitted to room. Hopeful for evening discharge.

## 2020-03-15 NOTE — ED NOTES
Pt did orthos with blood pressure and pulse. Patient did well until standing portion of the test. Pt stated \" I feel like I am seeing stars and going in circles. \" MD and RN notified.       Suki Goodrich  03/15/20 [id number]

## 2020-03-16 VITALS
BODY MASS INDEX: 29.82 KG/M2 | HEIGHT: 67 IN | WEIGHT: 190 LBS | DIASTOLIC BLOOD PRESSURE: 86 MMHG | SYSTOLIC BLOOD PRESSURE: 124 MMHG | TEMPERATURE: 98.9 F | OXYGEN SATURATION: 99 % | RESPIRATION RATE: 16 BRPM | HEART RATE: 96 BPM

## 2020-03-16 LAB
ANION GAP SERPL CALCULATED.3IONS-SCNC: 11 MMOL/L (ref 3–16)
BUN BLDV-MCNC: 7 MG/DL (ref 7–20)
CALCIUM SERPL-MCNC: 8.3 MG/DL (ref 8.3–10.6)
CHLORIDE BLD-SCNC: 104 MMOL/L (ref 99–110)
CO2: 22 MMOL/L (ref 21–32)
CREAT SERPL-MCNC: <0.5 MG/DL (ref 0.6–1.1)
GFR AFRICAN AMERICAN: >60
GFR NON-AFRICAN AMERICAN: >60
GLUCOSE BLD-MCNC: 93 MG/DL (ref 70–99)
HCT VFR BLD CALC: 37.4 % (ref 36–48)
HEMOGLOBIN: 12.8 G/DL (ref 12–16)
MCH RBC QN AUTO: 30.3 PG (ref 26–34)
MCHC RBC AUTO-ENTMCNC: 34.2 G/DL (ref 31–36)
MCV RBC AUTO: 88.8 FL (ref 80–100)
PDW BLD-RTO: 12.3 % (ref 12.4–15.4)
PLATELET # BLD: 230 K/UL (ref 135–450)
PMV BLD AUTO: 8 FL (ref 5–10.5)
POTASSIUM REFLEX MAGNESIUM: 3.8 MMOL/L (ref 3.5–5.1)
RBC # BLD: 4.21 M/UL (ref 4–5.2)
SODIUM BLD-SCNC: 137 MMOL/L (ref 136–145)
WBC # BLD: 6.1 K/UL (ref 4–11)

## 2020-03-16 PROCEDURE — 36415 COLL VENOUS BLD VENIPUNCTURE: CPT

## 2020-03-16 PROCEDURE — 6370000000 HC RX 637 (ALT 250 FOR IP): Performed by: NURSE PRACTITIONER

## 2020-03-16 PROCEDURE — 2580000003 HC RX 258: Performed by: INTERNAL MEDICINE

## 2020-03-16 PROCEDURE — 80048 BASIC METABOLIC PNL TOTAL CA: CPT

## 2020-03-16 PROCEDURE — G0378 HOSPITAL OBSERVATION PER HR: HCPCS

## 2020-03-16 PROCEDURE — 85027 COMPLETE CBC AUTOMATED: CPT

## 2020-03-16 RX ADMIN — SODIUM CHLORIDE: 9 INJECTION, SOLUTION INTRAVENOUS at 00:36

## 2020-03-16 RX ADMIN — SIMETHICONE 80 MG: 80 TABLET, CHEWABLE ORAL at 04:29

## 2020-03-16 RX ADMIN — SODIUM CHLORIDE: 9 INJECTION, SOLUTION INTRAVENOUS at 08:11

## 2020-03-16 ASSESSMENT — PAIN DESCRIPTION - DESCRIPTORS
DESCRIPTORS: CRAMPING
DESCRIPTORS: CRAMPING

## 2020-03-16 ASSESSMENT — PAIN DESCRIPTION - ORIENTATION
ORIENTATION: RIGHT;LEFT;MID
ORIENTATION: RIGHT;LEFT;MID

## 2020-03-16 ASSESSMENT — PAIN SCALES - GENERAL
PAINLEVEL_OUTOF10: 0
PAINLEVEL_OUTOF10: 3
PAINLEVEL_OUTOF10: 3
PAINLEVEL_OUTOF10: 0
PAINLEVEL_OUTOF10: 0

## 2020-03-16 ASSESSMENT — PAIN DESCRIPTION - LOCATION
LOCATION: ABDOMEN
LOCATION: ABDOMEN

## 2020-03-16 ASSESSMENT — PAIN DESCRIPTION - PAIN TYPE
TYPE: ACUTE PAIN
TYPE: ACUTE PAIN

## 2020-03-16 NOTE — PLAN OF CARE
Patient remains free from falls and injuries. Patient educated to let staff know if she has any more issues with dizziness/lightheadedness/balance.   Patient okay to ambulate with a standby to independent assist.  Allan Godoy

## 2020-03-16 NOTE — DISCHARGE SUMMARY
Hospital Medicine Discharge Summary    Patient: Guzman Molina     Age: 38 y.o. Gender: female  : 1975   MRN: [id number]  Code Status: Full     Admit Date: 3/15/2020   Discharge Date: 3/16/2020    Disposition:  Home     Condition at Discharge: Stable    Primary Care Provider: No primary care provider on file. Admitting Physician: Bob Selby MD  Discharge Physician: Ronald Irvin MD       Discharge Diagnoses: Active Hospital Problems    Diagnosis    Syncope and collapse [R55]       Hospital Course:   History Of Present Illness:  38 y.o. female with PMH as mentioned below and recently started on chlorthalidone and lisinopril for hypertension by her PCP on 3/13/2020 presented to [facility] ED with c/o dizziness and near fainting episode last night. Patient denies any nausea, vomiting but admits to having a large BM last night. Complains of lower abdominal soreness. Denies chest pain, palpitations, pedal edema, cough, shortness of breath. Denies any similar complaints in the past.  Attributes her symptoms to the new medications. ED course : Upon arrival to ED patient noted to be hypotensive and orthostatic vitals positive. She received 2 L of fluid bolus in ED, which subsequently improved her blood pressure to high 90s into early 129 systolic. She feels better. Repeat orthostatic vitals as per ED physician were still positive and given her abdominal pain CT abdomen pelvis was obtained which showed acute enteritis. Patient was ordered 1 more liter fluid and hospitalist consulted for admission for further evaluation and management    Assessment/Plan: 1. Syncope x 2 due to Orthostatic Hypotension likely Medication related   -Patient recently started on chlorthalidone 25 mg along with lisinopril 10 mg by PCP  -Systolic blood pressures in 60's upon arrival to ED as per ED physician . Received 2 L fluid bolus in ED; repeat orthostatics mildly positive.  Additional IVF CHANGED    Details   lisinopril (PRINIVIL;ZESTRIL) 10 MG tablet Take 10 mg by mouth dailyHistorical Med      azelastine HCl 0.15 % SOLN 1 spray by Nasal route dailyHistorical Med      fluticasone (FLONASE) 50 MCG/ACT nasal spray 1 spray by Each Nostril route dailyHistorical Med      topiramate (TOPAMAX) 25 MG tablet Take 1 tablet by mouth nightly, Disp-30 tablet, R-3Normal      cyclobenzaprine (FLEXERIL) 10 MG tablet Take 1 tablet by mouth nightly as needed for Muscle spasms, Disp-30 tablet, R-3Normal      Diclofenac Sodium POWD 2 g by Does not apply route 4 times daily Formula 3D Baclo 2% Diclo 3% Mary Beth 6% Lido 2% Prilo 2% Pharm to comp, Disp-240 g, R-5Normal      Norethin Ace-Eth Estrad-FE (BLISOVI 24 FE PO) Take by mouthHistorical Med      Loratadine (CLARITIN) 10 MG CAPS Take 1 capsule by mouth daily           Discharge Medication List as of 3/16/2020  5:38 PM      STOP taking these medications       chlorthalidone (HYGROTON) 25 MG tablet Comments:   Reason for Stopping:                 Significant Test Results    No results found. Consults:     IP CONSULT TO HOSPITALIST    Labs: For convenience and continuity at follow-up the following most recent labs are provided:    Lab Results   Component Value Date    WBC 6.1 03/16/2020    HGB 12.8 03/16/2020    HCT 37.4 03/16/2020    MCV 88.8 03/16/2020     03/16/2020     03/16/2020    K 3.8 03/16/2020     03/16/2020    CO2 22 03/16/2020    BUN 7 03/16/2020    CREATININE <0.5 03/16/2020    CALCIUM 8.3 03/16/2020    TROPONINI <0.01 03/15/2020    ALKPHOS 53 03/15/2020    ALT 16 03/15/2020    AST 14 03/15/2020    BILITOT 0.4 03/15/2020    LABALBU 3.8 03/15/2020     No results found for: INR      The patient was seen and examined on day of discharge and this discharge summary is in conjunction with any daily progress note from day of discharge.  Time spent on discharge is more than 30 minutes in the examination, evaluation, counseling and review of

## 2020-03-16 NOTE — DISCHARGE INSTR - COC
Continuity of Care Form    Patient Name: Merle Hernandez   :  1975  MRN:  [id number]    Admit date:  3/15/2020  Discharge date:  ***    Code Status Order: Full Code   Advance Directives:   Advance Care Flowsheet Documentation     Date/Time Healthcare Directive Type of Healthcare Directive Copy in [address] Agent's Name Healthcare Agent's Phone Number    03/15/20 1004  No, patient does not have an advance directive for healthcare treatment -- -- -- -- --          Admitting Physician:  Cheyanne Mahoney MD  PCP: No primary care provider on file.     Discharging Nurse: [facility] Unit/Room#: 8073/7089-97  Discharging Unit Phone Number: ***    Emergency Contact:   Extended Emergency Contact Information  Primary Emergency Contact: [facility] [facility] [address] Phone: [phone]  Mobile Phone: [phone]  Relation: Domestic Partner    Past Surgical History:  Past Surgical History:   Procedure Laterality Date    APPENDECTOMY       SECTION      DENTAL SURGERY         Immunization History:   Immunization History   Administered Date(s) Administered    Hepatitis A/Hepatitis B (Twinrix) 2010, 2010, 2011    Influenza Virus Vaccine 10/01/2014    Influenza Whole 2011, 10/01/2014    Influenza, MDCK Quadv, IM, PF (Flucelvax 4 yrs and older) 10/21/2017    Influenza, Bettyjo Bickers, IM, PF (6 mo and older Fluzone, Flulaval, Fluarix, and 3 yrs and older Afluria) 2018, 2019    Tdap (Boostrix, Adacel) 2010, 2016       Active Problems:  Patient Active Problem List   Diagnosis Code    Eczema L30.9    Syncope and collapse R55       Isolation/Infection:   Isolation          No Isolation        Patient Infection Status     None to display          Nurse Assessment:  Last Vital Signs: /86   Pulse 96   Temp 98.9 °F (37.2 °C) (Oral)   Resp 16   Ht 5' 7\" (1.702 m)   Wt 190 lb (86.2 kg)   SpO2 99%   BMI 29.76 kg/m²     Last documented pain score (0-10 scale): Pain Level: 0  Last Weight:   Wt Readings from Last 1 Encounters:   03/15/20 190 lb (86.2 kg)     Mental Status:  {IP PT MENTAL STATUS:13010}    IV Access:  { MARY IV ACCESS:504386892}    Nursing Mobility/ADLs:  Walking   {CHP DME HQPK:176311847}  Transfer  {CHP DME ILBW:214405910}  Bathing  {CHP DME USIH:016741771}  Dressing  {CHP DME IBFQ:524842446}  Toileting  {CHP DME ZRTV:964302241}  Feeding  {CHP DME YEFK:953720698}  Med Admin  {CHP DME JLGB:655406196}  Med Delivery   { MARY MED Delivery:822909906}    Wound Care Documentation and Therapy:  Incision 17 Abdomen Mid;Right; Lower; Left (Active)   Number of days: 1169        Elimination:  Continence:   · Bowel: {YES / N}  · Bladder: {YES / E}  Urinary Catheter: {Urinary Catheter:407041180}   Colostomy/Ileostomy/Ileal Conduit: {YES / OK:49308}       Date of Last BM: ***    Intake/Output Summary (Last 24 hours) at 3/16/2020 1627  Last data filed at 3/16/2020 1320  Gross per 24 hour   Intake 1320 ml   Output --   Net 1320 ml     I/O last 3 completed shifts:   In: 1800 [P.O.:1800]  Out: -     Safety Concerns:     [address] Safety Concerns:420346918}    Impairments/Disabilities:      [address] Impairments/Disabilities:360174586}    Nutrition Therapy:  Current Nutrition Therapy:   [address] Diet List:460766066}    Routes of Feeding: {CHP DME Other Feedings:111464563}  Liquids: {Slp liquid thickness:67359}  Daily Fluid Restriction: {CHP DME Yes amt example:957723883}  Last Modified Barium Swallow with Video (Video Swallowing Test): {Done Not Done HLWY:897060273}    Treatments at the Time of Hospital Discharge:   Respiratory Treatments: ***  Oxygen Therapy:  {Therapy; copd oxygen:58217}  Ventilator:    { CC Vent UWSP:032448671}    Rehab Therapies: {THERAPEUTIC INTERVENTION:3475812646}  Weight Bearing Status/Restrictions: [address] MONTE Weight Bearin}  Other Medical Equipment (for information only, NOT a DME order):

## 2020-03-16 NOTE — PROGRESS NOTES
Patient was provided written and oral discharge instructions. Patient instructed to follow up with PCP in 1-2 weeks, and to hold lisinopril for a few days and then restart if pt's SBP is above 140. Patient stated she had all of her belongings and was then discharged into the care of family.  Júnior Frank

## 2021-04-11 ENCOUNTER — HOSPITAL ENCOUNTER (EMERGENCY)
Age: 46
Discharge: HOME | End: 2021-04-11
Payer: COMMERCIAL

## 2021-04-11 VITALS
HEIGHT: 67 IN | DIASTOLIC BLOOD PRESSURE: 93 MMHG | RESPIRATION RATE: 18 BRPM | HEART RATE: 88 BPM | OXYGEN SATURATION: 100 % | BODY MASS INDEX: 30.92 KG/M2 | WEIGHT: 197 LBS | TEMPERATURE: 97.7 F | SYSTOLIC BLOOD PRESSURE: 145 MMHG

## 2021-04-11 DIAGNOSIS — R21 RASH: ICD-10-CM

## 2021-04-11 DIAGNOSIS — T78.40XA ALLERGIC REACTION, INITIAL ENCOUNTER: Primary | ICD-10-CM

## 2021-04-11 PROCEDURE — 6370000000 HC RX 637 (ALT 250 FOR IP): Performed by: PHYSICIAN ASSISTANT

## 2021-04-11 PROCEDURE — 99284 EMERGENCY DEPT VISIT MOD MDM: CPT

## 2021-04-11 RX ORDER — PREDNISONE 20 MG/1
60 TABLET ORAL ONCE
Status: COMPLETED | OUTPATIENT
Start: 2021-04-11 | End: 2021-04-11

## 2021-04-11 RX ORDER — PREDNISONE 20 MG/1
40 TABLET ORAL DAILY
Qty: 8 TABLET | Refills: 0 | Status: SHIPPED | OUTPATIENT
Start: 2021-04-11 | End: 2021-04-15

## 2021-04-11 RX ORDER — FAMOTIDINE 20 MG/1
20 TABLET, FILM COATED ORAL ONCE
Status: COMPLETED | OUTPATIENT
Start: 2021-04-11 | End: 2021-04-11

## 2021-04-11 RX ORDER — FAMOTIDINE 20 MG/1
20 TABLET, FILM COATED ORAL 2 TIMES DAILY
Qty: 8 TABLET | Refills: 0 | Status: SHIPPED | OUTPATIENT
Start: 2021-04-11 | End: 2021-04-15

## 2021-04-11 RX ADMIN — FAMOTIDINE 20 MG: 20 TABLET, FILM COATED ORAL at 10:43

## 2021-04-11 RX ADMIN — PREDNISONE 60 MG: 20 TABLET ORAL at 10:43

## 2021-04-11 ASSESSMENT — ENCOUNTER SYMPTOMS
CHEST TIGHTNESS: 0
TROUBLE SWALLOWING: 0
ABDOMINAL PAIN: 0
EYE REDNESS: 0
SHORTNESS OF BREATH: 0
WHEEZING: 0
EYE DISCHARGE: 0
STRIDOR: 0

## 2021-04-11 NOTE — ED PROVIDER NOTES
[facility]  ED  EMERGENCY DEPARTMENT ENCOUNTER        Pt Name: Seble Rollins  MRN: [id number]  [place] 1975  Date of evaluation: 4/11/2021  Provider: Kaz Pickard PA-C  PCP: Erik Villalobos DO  ED Attending: Nelida Mcarthur MD      This patient was not seen by the attending provider     History provided by the patient    CHIEF COMPLAINT:     Chief Complaint   Patient presents with    Allergic Reaction     woke up feeling like ears and face were swollen. rash on arms and legs. HISTORY OF PRESENT ILLNESS:      Seble Rollins is a 54 y.o. female who arrives to the ED by private vehicle. Patient states she woke up around 8 AM with an itchy rash to her face, ears and bilateral upper extremities. Minimal lower extremity involvement. She thinks it is an allergic reaction but cannot identify what could have triggered it. She denies any known allergies outside of dust mites. She denies any new foods or medications. She essentially woke up like this. She states she was drinking coffee this morning and had a feeling that her tongue might be a little swollen. She took 2 Benadryl and came to the emergency department. On arrival patient denies pain. Patient denies shortness of breath or difficulty swallowing. The symptoms were concerning to her and at this point she cannot identify any specific exacerbating or alleviating factors. Benadryl has not seemed to help yet. Nursing Notes were reviewed     REVIEW OF SYSTEMS:     Review of Systems   Constitutional: Negative for chills and fever. HENT: Negative for trouble swallowing. Eyes: Negative for discharge and redness. Respiratory: Negative for chest tightness, shortness of breath, wheezing and stridor. Cardiovascular: Negative for chest pain. Gastrointestinal: Negative for abdominal pain. Genitourinary: Negative. Musculoskeletal: Negative for neck pain. Skin: Positive for rash. Neurological: Negative for headaches. All other systems reviewed and are negative. Except as noted above in the ROS, all other systems were reviewed and negative. PAST MEDICAL HISTORY:     Past Medical History:   Diagnosis Date    Anxiety     Back pain     Eczema     Hypertension     Rheumatic fever     Anemia from this    Seasonal allergies     Strep throat     Continues with annual strep even into adulthood. SURGICAL HISTORY:      Past Surgical History:   Procedure Laterality Date    APPENDECTOMY       SECTION      DENTAL SURGERY           CURRENT MEDICATIONS:       Previous Medications    AZELASTINE HCL 0.15 % SOLN    1 spray by Nasal route daily    CYCLOBENZAPRINE (FLEXERIL) 10 MG TABLET    Take 1 tablet by mouth nightly as needed for Muscle spasms    DICLOFENAC SODIUM POWD    2 g by Does not apply route 4 times daily Formula 3D Baclo 2% Diclo 3% Mary Beth 6% Lido 2% Prilo 2% Pharm to comp    FLUTICASONE (FLONASE) 50 MCG/ACT NASAL SPRAY    1 spray by Each Nostril route daily    LISINOPRIL (PRINIVIL;ZESTRIL) 10 MG TABLET    Take 10 mg by mouth daily    LORATADINE (CLARITIN) 10 MG CAPS    Take 1 capsule by mouth daily    NORETHIN ACE-ETH ESTRAD-FE (BLISOVI 24 FE PO)    Take by mouth    TOPIRAMATE (TOPAMAX) 25 MG TABLET    Take 1 tablet by mouth nightly         ALLERGIES:    Patient has no known allergies.     FAMILY HISTORY:       Family History   Problem Relation Age of Onset    High Blood Pressure Mother     High Cholesterol Mother     High Blood Pressure Father     Cancer Father         Precancers    High Cholesterol Father     Thyroid Disease Sister         nodule/mass - benign          SOCIAL HISTORY:       Social History     Socioeconomic History    Marital status:      Spouse name: None    Number of children: None    Years of education: None    Highest education level: None   Occupational History    None   Social Needs    Financial resource strain: None    Food insecurity     Worry: None Inability: None    Transportation needs     Medical: None     Non-medical: None   Tobacco Use    Smoking status: Never Smoker    Smokeless tobacco: Never Used   Substance and Sexual Activity    Alcohol use: Yes     Alcohol/week: 0.0 standard drinks     Comment: Occasional - weekend use    Drug use: No    Sexual activity: None   Lifestyle    Physical activity     Days per week: None     Minutes per session: None    Stress: None   Relationships    Social connections     Talks on phone: None     Gets together: None     Attends Rastafari service: None     Active member of club or organization: None     Attends meetings of clubs or organizations: None     Relationship status: None    Intimate partner violence     Fear of current or ex partner: None     Emotionally abused: None     Physically abused: None     Forced sexual activity: None   Other Topics Concern    None   Social History Narrative    None       SCREENINGS:             PHYSICAL EXAM:       ED Triage Vitals [04/11/21 1003]   BP Temp Temp Source Pulse Resp SpO2 Height Weight   (!) 145/93 97.7 °F (36.5 °C) Oral 88 18 100 % 5' 7\" (1.702 m) 197 lb (89.4 kg)       Physical Exam    CONSTITUTIONAL: Awake and alert. Cooperative. Well-developed. Well-nourished. Non-toxic. No acute distress. HENT: Normocephalic. Atraumatic. External ears normal, without discharge. No nasal discharge. Oropharynx clear. Mucous membranes moist.  No posterior oropharyngeal swelling or edema. Uvula midline. EYES: Conjunctiva non-injected. No scleral icterus. PERRL. EOM's grossly intact. NECK: Supple. Normal ROM. CARDIOVASCULAR: RRR. No Murmer. Intact distal pulses. PULMONARY/CHEST WALL: Effort normal. No tachypnea. Lungs clear to ausculation. ABDOMEN: Normal BS. Soft. Nondistended. No tenderness to palpate. No guarding. /ANORECTAL: Not assessed  MUSKULOSKELETAL: Normal ROM. No acute deformities. No edema. No tenderness to palpate. SKIN: Warm and dry.   Blotchy, the symptoms which are most concerning (e.g., trouble breathing, fever, changing or worsening pain, vomiting) that necessitate immediate return. FINAL IMPRESSION:      1. Allergic reaction, initial encounter    2. Rash          DISPOSITION/PLAN:   DISPOSITION Decision To Discharge      PATIENT REFERRED TO:  Burgess Sorenson.  Kandace Mera [address] [place] [address]  [phone]    Schedule an appointment as soon as possible for a visit         DISCHARGE MEDICATIONS:  New Prescriptions    FAMOTIDINE (PEPCID) 20 MG TABLET    Take 1 tablet by mouth 2 times daily for 4 days    PREDNISONE (DELTASONE) 20 MG TABLET    Take 2 tablets by mouth daily for 4 days                  (Please note thatportions of this note were completed with a voice recognition program.  Efforts were made to edit the dictations, but occasionally words are mis-transcribed.)    Ion Maher PA-C (electronicallysigned)              Kathryn Parra, [place]  04/11/21 8130

## 2021-04-11 NOTE — ED NOTES
Symptoms improved. [id number] [facility]. Ambulatory to [facility].      Sapna Black RN  04/11/21 7090